# Patient Record
Sex: MALE | Race: BLACK OR AFRICAN AMERICAN | NOT HISPANIC OR LATINO | Employment: UNEMPLOYED | URBAN - METROPOLITAN AREA
[De-identification: names, ages, dates, MRNs, and addresses within clinical notes are randomized per-mention and may not be internally consistent; named-entity substitution may affect disease eponyms.]

---

## 2018-01-16 ENCOUNTER — HOSPITAL ENCOUNTER (OUTPATIENT)
Dept: PREADMISSION TESTING | Facility: HOSPITAL | Age: 33
Discharge: HOME | End: 2018-01-16
Attending: SURGERY
Payer: MEDICAID

## 2018-01-16 VITALS
RESPIRATION RATE: 17 BRPM | HEIGHT: 66 IN | BODY MASS INDEX: 25.04 KG/M2 | HEART RATE: 71 BPM | TEMPERATURE: 99 F | SYSTOLIC BLOOD PRESSURE: 145 MMHG | DIASTOLIC BLOOD PRESSURE: 86 MMHG | WEIGHT: 155.81 LBS | OXYGEN SATURATION: 98 %

## 2018-01-16 RX ORDER — ASPIRIN 325 MG
325 TABLET ORAL DAILY PRN
COMMUNITY
End: 2018-06-26

## 2018-01-16 NOTE — DISCHARGE INSTRUCTIONS
Your surgery is scheduled for_MONDAY January 22, 2018________________.    Call [phone] between 2 pm and 5 pm _FRIDAY January 19, 2018_____ to find out your arrival time for the day of surgery.      Report to SAME DAY SURGERY UNIT at _______AM   on the 2nd floor of the hospital.  Use the front entrance of the hospital before 6 am.                                         Important instructions:   Do not eat or drink after 12 midnight, including water.  It is okay to brush your teeth.                                                                                                                                                                                                           Do not have gum, candy or mints.    TAKE  WELLBUTRIN  WITH A SIP OF WATER THE  AM OF SURGERY               Prep instructions:    SHOWER   OTHER_____________      Please shower the night before and the morning of your surgery.       Use Hibiclens soap to your surgery site if instructed by your pre op nurse.   If your surgery is on your abdomen, be sure to wash your naval( BELLY BUTTON).    Be sure to rinse off Hibiclens after it is on your skin for several minutes.  Do not use Hibiclens on your face or genitals.     You may wear deodorant only.     Do not wear powder, body lotion or cologne.     If you are going home on the same day of surgery, you must have arrangements for a ride home.  You will not be able to drive home if you were given anesthesia or sedation.       Stop taking Aspirin, Ibuprofen, Motrin and Aleve at least 3-5 days before your surgery.                                                                                                                                                                                                                     May take Tylenol / Acetaminophen  If needed     Wear loose fitting clothes allowing for bandages.     Please leave money and valuables home.        You may bring  your cell phone.     Call the doctor if fever or illness should occur before your surgery.    Call [phone] to contact us here at [facility] if needed.

## 2018-01-19 ENCOUNTER — ANESTHESIA EVENT (OUTPATIENT)
Dept: SURGERY | Facility: HOSPITAL | Age: 33
End: 2018-01-19

## 2018-05-14 ENCOUNTER — CLINICAL SUPPORT (OUTPATIENT)
Dept: OCCUPATIONAL MEDICINE | Facility: CLINIC | Age: 33
End: 2018-05-14

## 2018-05-14 DIAGNOSIS — Z11.1 PPD SCREENING TEST: Primary | ICD-10-CM

## 2018-05-14 PROCEDURE — 86580 TB INTRADERMAL TEST: CPT | Mod: S$GLB,,, | Performed by: FAMILY MEDICINE

## 2018-06-26 ENCOUNTER — HOSPITAL ENCOUNTER (OUTPATIENT)
Dept: PREADMISSION TESTING | Facility: HOSPITAL | Age: 33
Discharge: HOME | End: 2018-06-26
Attending: SURGERY
Payer: MEDICAID

## 2018-06-26 VITALS
HEIGHT: 68 IN | OXYGEN SATURATION: 99 % | HEART RATE: 67 BPM | RESPIRATION RATE: 18 BRPM | SYSTOLIC BLOOD PRESSURE: 124 MMHG | BODY MASS INDEX: 24.86 KG/M2 | DIASTOLIC BLOOD PRESSURE: 68 MMHG | WEIGHT: 164 LBS

## 2018-06-26 DIAGNOSIS — Z01.818 PRE-OP TESTING: Primary | ICD-10-CM

## 2018-06-26 LAB
ANION GAP SERPL CALC-SCNC: 11 MMOL/L
BASOPHILS # BLD AUTO: 0.01 K/UL
BASOPHILS NFR BLD: 0.2 %
BUN SERPL-MCNC: 28 MG/DL
CALCIUM SERPL-MCNC: 10.3 MG/DL
CHLORIDE SERPL-SCNC: 105 MMOL/L
CO2 SERPL-SCNC: 23 MMOL/L
CREAT SERPL-MCNC: 1.3 MG/DL
DIFFERENTIAL METHOD: ABNORMAL
EOSINOPHIL # BLD AUTO: 0.1 K/UL
EOSINOPHIL NFR BLD: 1.1 %
ERYTHROCYTE [DISTWIDTH] IN BLOOD BY AUTOMATED COUNT: 12.6 %
EST. GFR  (AFRICAN AMERICAN): >60 ML/MIN/1.73 M^2
EST. GFR  (NON AFRICAN AMERICAN): >60 ML/MIN/1.73 M^2
GLUCOSE SERPL-MCNC: 88 MG/DL
HCT VFR BLD AUTO: 39 %
HGB BLD-MCNC: 13.9 G/DL
LYMPHOCYTES # BLD AUTO: 2.8 K/UL
LYMPHOCYTES NFR BLD: 52 %
MCH RBC QN AUTO: 30.7 PG
MCHC RBC AUTO-ENTMCNC: 35.6 G/DL
MCV RBC AUTO: 86 FL
MONOCYTES # BLD AUTO: 0.5 K/UL
MONOCYTES NFR BLD: 9.6 %
NEUTROPHILS # BLD AUTO: 2 K/UL
NEUTROPHILS NFR BLD: 36.9 %
PLATELET # BLD AUTO: 256 K/UL
PMV BLD AUTO: 11 FL
POTASSIUM SERPL-SCNC: 3.9 MMOL/L
RBC # BLD AUTO: 4.53 M/UL
SODIUM SERPL-SCNC: 139 MMOL/L
WBC # BLD AUTO: 5.31 K/UL

## 2018-06-26 PROCEDURE — 36415 COLL VENOUS BLD VENIPUNCTURE: CPT

## 2018-06-26 PROCEDURE — 80048 BASIC METABOLIC PNL TOTAL CA: CPT

## 2018-06-26 PROCEDURE — 85025 COMPLETE CBC W/AUTO DIFF WBC: CPT

## 2018-06-26 NOTE — DISCHARGE INSTRUCTIONS
Your surgery is scheduled for____7/2/2018_____________.    Call [phone] between 2 pm and 5 pm ___6/29/2018_________ to find out your arrival time for the day of surgery.    Report to SAME DAY SURGERY UNIT at _______am on the 2nd floor of the hospital.  Use the front entrance of the hospital before 6 am.  If you need wheelchair assistance, call [phone] from your cell phone,  or call 0 from the courtesy phone in the hospital lobby.    Important instructions:   Do not eat or drink after 12 midnight, including water.  It is okay to brush your teeth.  Do not have gum, candy or mints.     Take only these medications with a small swallow of water on the morning of your surgery.____wellbutrin_________    Do not take any diabetic medication on the morning of surgery unless instructed to do so by your doctor or pre op nurse.    Stop taking Aspirin, Ibuprofen, Motrin and Aleve , Fish oil, and Vitamin E for at least 7 days before your surgery. You may use Tylenol unless otherwise instructed by your doctor.         Please shower the night before and the morning of your surgery.        Use Hibiclens soap to your surgery site if instructed by your pre op nurse.   If your surgery is on your abdomen, be sure to wash your naval.  Be sure to rinse off Hibiclens after it is on your skin for several minutes.  Do not use Hibiclens on your face or genitals. Please place clean linens on your bed the night before surgery. Please wear fresh clean clothing after each shower.     No shaving of procedural area at least 4-5 days before surgery due to increased risk of skin irritation and/or possible infection.      You may wear deodorant only.      Do not wear powder, body lotion or cologne.     Do not wear any jewelry or have any metal on your body.     Please bring any documents given to you by your doctor.     If you are going home on the same day of surgery, you must have arrangements for a ride home.  You will not be able to  drive home if you were given anesthesia or sedation.     Wear loose fitting clothes allowing for bandages.     Please leave money and valuables home.       You may bring your cell phone.     Call the doctor if fever or illness should occur before your surgery.    Call [phone] to contact us here at [facility] if needed.

## 2018-07-01 ENCOUNTER — ANESTHESIA EVENT (OUTPATIENT)
Dept: SURGERY | Facility: HOSPITAL | Age: 33
End: 2018-07-01
Payer: MEDICAID

## 2018-07-02 ENCOUNTER — ANESTHESIA (OUTPATIENT)
Dept: SURGERY | Facility: HOSPITAL | Age: 33
End: 2018-07-02
Payer: MEDICAID

## 2018-07-02 ENCOUNTER — HOSPITAL ENCOUNTER (OUTPATIENT)
Facility: HOSPITAL | Age: 33
Discharge: HOME | End: 2018-07-02
Attending: SURGERY | Admitting: SURGERY
Payer: MEDICAID

## 2018-07-02 VITALS
SYSTOLIC BLOOD PRESSURE: 156 MMHG | HEIGHT: 68 IN | TEMPERATURE: 98 F | RESPIRATION RATE: 16 BRPM | HEART RATE: 65 BPM | BODY MASS INDEX: 24.86 KG/M2 | WEIGHT: 164 LBS | DIASTOLIC BLOOD PRESSURE: 89 MMHG | OXYGEN SATURATION: 100 %

## 2018-07-02 DIAGNOSIS — K40.90 HERNIA, INGUINAL, LEFT: Primary | ICD-10-CM

## 2018-07-02 PROCEDURE — 27201423 OPTIME MED/SURG SUP & DEVICES STERILE SUPPLY: Performed by: SURGERY

## 2018-07-02 PROCEDURE — 71000016 HC POSTOP RECOV ADDL HR: Performed by: SURGERY

## 2018-07-02 PROCEDURE — 25000003 PHARM REV CODE 250: Performed by: ANESTHESIOLOGY

## 2018-07-02 PROCEDURE — 63600175 PHARM REV CODE 636 W HCPCS: Performed by: SURGERY

## 2018-07-02 PROCEDURE — 36000706: Performed by: SURGERY

## 2018-07-02 PROCEDURE — 37000009 HC ANESTHESIA EA ADD 15 MINS: Performed by: SURGERY

## 2018-07-02 PROCEDURE — 25000003 PHARM REV CODE 250: Performed by: REGISTERED NURSE

## 2018-07-02 PROCEDURE — 71000015 HC POSTOP RECOV 1ST HR: Performed by: SURGERY

## 2018-07-02 PROCEDURE — 36000707: Performed by: SURGERY

## 2018-07-02 PROCEDURE — 00830 ANES HERNIA RPR LWR ABD NOS: CPT | Performed by: SURGERY

## 2018-07-02 PROCEDURE — D9220A PRA ANESTHESIA: Mod: ANES,,, | Performed by: ANESTHESIOLOGY

## 2018-07-02 PROCEDURE — C1781 MESH (IMPLANTABLE): HCPCS | Performed by: SURGERY

## 2018-07-02 PROCEDURE — 63600175 PHARM REV CODE 636 W HCPCS: Performed by: REGISTERED NURSE

## 2018-07-02 PROCEDURE — S0020 INJECTION, BUPIVICAINE HYDRO: HCPCS | Performed by: SURGERY

## 2018-07-02 PROCEDURE — 25000003 PHARM REV CODE 250: Performed by: SURGERY

## 2018-07-02 PROCEDURE — D9220A PRA ANESTHESIA: Mod: CRNA,,, | Performed by: REGISTERED NURSE

## 2018-07-02 PROCEDURE — 37000008 HC ANESTHESIA 1ST 15 MINUTES: Performed by: SURGERY

## 2018-07-02 PROCEDURE — 71000033 HC RECOVERY, INTIAL HOUR: Performed by: SURGERY

## 2018-07-02 DEVICE — MESH PROLENE POLY LG HERNIA: Type: IMPLANTABLE DEVICE | Site: INGUINAL | Status: FUNCTIONAL

## 2018-07-02 RX ORDER — FENTANYL CITRATE 50 UG/ML
INJECTION, SOLUTION INTRAMUSCULAR; INTRAVENOUS
Status: DISCONTINUED | OUTPATIENT
Start: 2018-07-02 | End: 2018-07-02

## 2018-07-02 RX ORDER — METOPROLOL TARTRATE 1 MG/ML
INJECTION, SOLUTION INTRAVENOUS
Status: DISCONTINUED | OUTPATIENT
Start: 2018-07-02 | End: 2018-07-02

## 2018-07-02 RX ORDER — OXYCODONE AND ACETAMINOPHEN 5; 325 MG/1; MG/1
1 TABLET ORAL EVERY 4 HOURS PRN
Status: DISCONTINUED | OUTPATIENT
Start: 2018-07-02 | End: 2018-07-02 | Stop reason: HOSPADM

## 2018-07-02 RX ORDER — SODIUM CHLORIDE, SODIUM LACTATE, POTASSIUM CHLORIDE, CALCIUM CHLORIDE 600; 310; 30; 20 MG/100ML; MG/100ML; MG/100ML; MG/100ML
1000 INJECTION, SOLUTION INTRAVENOUS ONCE
Status: DISCONTINUED | OUTPATIENT
Start: 2018-07-02 | End: 2018-07-02 | Stop reason: HOSPADM

## 2018-07-02 RX ORDER — METOCLOPRAMIDE HYDROCHLORIDE 5 MG/ML
INJECTION INTRAMUSCULAR; INTRAVENOUS
Status: DISCONTINUED | OUTPATIENT
Start: 2018-07-02 | End: 2018-07-02

## 2018-07-02 RX ORDER — GLYCOPYRROLATE 0.2 MG/ML
INJECTION INTRAMUSCULAR; INTRAVENOUS
Status: DISCONTINUED | OUTPATIENT
Start: 2018-07-02 | End: 2018-07-02

## 2018-07-02 RX ORDER — LIDOCAINE HYDROCHLORIDE 10 MG/ML
1 INJECTION, SOLUTION EPIDURAL; INFILTRATION; INTRACAUDAL; PERINEURAL ONCE
Status: DISCONTINUED | OUTPATIENT
Start: 2018-07-02 | End: 2018-07-02 | Stop reason: HOSPADM

## 2018-07-02 RX ORDER — SODIUM CHLORIDE 0.9 % (FLUSH) 0.9 %
3 SYRINGE (ML) INJECTION
Status: DISCONTINUED | OUTPATIENT
Start: 2018-07-02 | End: 2018-07-02 | Stop reason: HOSPADM

## 2018-07-02 RX ORDER — PROPOFOL 10 MG/ML
VIAL (ML) INTRAVENOUS
Status: DISCONTINUED | OUTPATIENT
Start: 2018-07-02 | End: 2018-07-02

## 2018-07-02 RX ORDER — MIDAZOLAM HYDROCHLORIDE 1 MG/ML
INJECTION, SOLUTION INTRAMUSCULAR; INTRAVENOUS
Status: DISCONTINUED | OUTPATIENT
Start: 2018-07-02 | End: 2018-07-02

## 2018-07-02 RX ORDER — SUCCINYLCHOLINE CHLORIDE 20 MG/ML
INJECTION INTRAMUSCULAR; INTRAVENOUS
Status: DISCONTINUED | OUTPATIENT
Start: 2018-07-02 | End: 2018-07-02

## 2018-07-02 RX ORDER — ROCURONIUM BROMIDE 10 MG/ML
INJECTION, SOLUTION INTRAVENOUS
Status: DISCONTINUED | OUTPATIENT
Start: 2018-07-02 | End: 2018-07-02

## 2018-07-02 RX ORDER — BUPIVACAINE HYDROCHLORIDE 5 MG/ML
INJECTION, SOLUTION PERINEURAL
Status: DISCONTINUED | OUTPATIENT
Start: 2018-07-02 | End: 2018-07-02 | Stop reason: HOSPADM

## 2018-07-02 RX ORDER — OXYCODONE AND ACETAMINOPHEN 5; 325 MG/1; MG/1
1 TABLET ORAL EVERY 4 HOURS PRN
Qty: 35 TABLET | Refills: 0 | Status: SHIPPED | OUTPATIENT
Start: 2018-07-02 | End: 2018-07-12

## 2018-07-02 RX ORDER — NEOSTIGMINE METHYLSULFATE 1 MG/ML
INJECTION, SOLUTION INTRAVENOUS
Status: DISCONTINUED | OUTPATIENT
Start: 2018-07-02 | End: 2018-07-02

## 2018-07-02 RX ORDER — PHENYLEPHRINE HYDROCHLORIDE 10 MG/ML
INJECTION INTRAVENOUS
Status: DISCONTINUED | OUTPATIENT
Start: 2018-07-02 | End: 2018-07-02

## 2018-07-02 RX ORDER — HYDROMORPHONE HYDROCHLORIDE 2 MG/ML
0.2 INJECTION, SOLUTION INTRAMUSCULAR; INTRAVENOUS; SUBCUTANEOUS EVERY 5 MIN PRN
Status: DISCONTINUED | OUTPATIENT
Start: 2018-07-02 | End: 2018-07-02 | Stop reason: HOSPADM

## 2018-07-02 RX ORDER — ACETAMINOPHEN 10 MG/ML
INJECTION, SOLUTION INTRAVENOUS
Status: DISCONTINUED | OUTPATIENT
Start: 2018-07-02 | End: 2018-07-02

## 2018-07-02 RX ORDER — LIDOCAINE HCL/PF 100 MG/5ML
SYRINGE (ML) INTRAVENOUS
Status: DISCONTINUED | OUTPATIENT
Start: 2018-07-02 | End: 2018-07-02

## 2018-07-02 RX ORDER — SODIUM CHLORIDE, SODIUM LACTATE, POTASSIUM CHLORIDE, CALCIUM CHLORIDE 600; 310; 30; 20 MG/100ML; MG/100ML; MG/100ML; MG/100ML
INJECTION, SOLUTION INTRAVENOUS CONTINUOUS
Status: DISCONTINUED | OUTPATIENT
Start: 2018-07-02 | End: 2018-07-02 | Stop reason: HOSPADM

## 2018-07-02 RX ORDER — CEFAZOLIN SODIUM 2 G/50ML
2 SOLUTION INTRAVENOUS
Status: DISCONTINUED | OUTPATIENT
Start: 2018-07-02 | End: 2018-07-02 | Stop reason: HOSPADM

## 2018-07-02 RX ADMIN — PHENYLEPHRINE HYDROCHLORIDE 100 MCG: 10 INJECTION INTRAVENOUS at 09:07

## 2018-07-02 RX ADMIN — PHENYLEPHRINE HYDROCHLORIDE 100 MCG: 10 INJECTION INTRAVENOUS at 10:07

## 2018-07-02 RX ADMIN — METOPROLOL TARTRATE 2 MG: 1 INJECTION, SOLUTION INTRAVENOUS at 10:07

## 2018-07-02 RX ADMIN — LIDOCAINE HYDROCHLORIDE 100 MG: 20 INJECTION, SOLUTION INTRAVENOUS at 08:07

## 2018-07-02 RX ADMIN — FENTANYL CITRATE 100 MCG: 50 INJECTION INTRAMUSCULAR; INTRAVENOUS at 08:07

## 2018-07-02 RX ADMIN — MIDAZOLAM HYDROCHLORIDE 2 MG: 1 INJECTION, SOLUTION INTRAMUSCULAR; INTRAVENOUS at 08:07

## 2018-07-02 RX ADMIN — ROCURONIUM BROMIDE 5 MG: 10 INJECTION, SOLUTION INTRAVENOUS at 09:07

## 2018-07-02 RX ADMIN — CEFAZOLIN SODIUM 2 G: 2 SOLUTION INTRAVENOUS at 08:07

## 2018-07-02 RX ADMIN — ACETAMINOPHEN 1000 MG: 10 INJECTION, SOLUTION INTRAVENOUS at 09:07

## 2018-07-02 RX ADMIN — NEOSTIGMINE METHYLSULFATE 5 MG: 1 INJECTION INTRAVENOUS at 10:07

## 2018-07-02 RX ADMIN — SODIUM CHLORIDE, SODIUM LACTATE, POTASSIUM CHLORIDE, AND CALCIUM CHLORIDE 10 ML/HR: .6; .31; .03; .02 INJECTION, SOLUTION INTRAVENOUS at 07:07

## 2018-07-02 RX ADMIN — PROPOFOL 200 MG: 10 INJECTION, EMULSION INTRAVENOUS at 08:07

## 2018-07-02 RX ADMIN — METOCLOPRAMIDE 10 MG: 5 INJECTION, SOLUTION INTRAMUSCULAR; INTRAVENOUS at 09:07

## 2018-07-02 RX ADMIN — SUCCINYLCHOLINE CHLORIDE 100 MG: 20 INJECTION, SOLUTION INTRAMUSCULAR; INTRAVENOUS at 08:07

## 2018-07-02 RX ADMIN — ROCURONIUM BROMIDE 30 MG: 10 INJECTION, SOLUTION INTRAVENOUS at 09:07

## 2018-07-02 RX ADMIN — ROCURONIUM BROMIDE 10 MG: 10 INJECTION, SOLUTION INTRAVENOUS at 09:07

## 2018-07-02 RX ADMIN — GLYCOPYRROLATE 0.6 MG: 0.2 INJECTION, SOLUTION INTRAMUSCULAR; INTRAVENOUS at 10:07

## 2018-07-02 RX ADMIN — METOPROLOL TARTRATE 2 MG: 1 INJECTION, SOLUTION INTRAVENOUS at 09:07

## 2018-07-02 NOTE — TRANSFER OF CARE
"Anesthesia Transfer of Care Note    Patient: Isai Beach    Procedure(s) Performed: Procedure(s) (LRB):  INGUINAL HERNIA REPAIR (Left)    Patient location: PACU    Anesthesia Type: general    Transport from OR: Transported from OR on room air with adequate spontaneous ventilation    Post pain: adequate analgesia    Post assessment: no apparent anesthetic complications and tolerated procedure well    Post vital signs: stable    Level of consciousness: sedated    Nausea/Vomiting: no nausea/vomiting    Complications: none    Transfer of care protocol was followedComments: Oral airway in place      Last vitals:   Visit Vitals  BP (!) 140/79   Pulse 64   Temp 36.5 °C (97.7 °F) (Oral)   Resp 14   Ht 5' 8" (1.727 m)   Wt 74.4 kg (164 lb)   SpO2 100%   BMI 24.94 kg/m²     "

## 2018-07-02 NOTE — OP NOTE
DATE OF PROCEDURE:  07/02/2018    PREOPERATIVE DIAGNOSIS:  Left inguinal hernia.    POSTOPERATIVE DIAGNOSIS:  Left indirect inguinal hernia.    OPERATIVE PROCEDURE:  Left inguinal hernia repair with Prolene hernia system.    SURGEON:  Mir Aragon M.D.    ASSISTANT:  Dr Devlin  PROCEDURE IN DETAIL:  After adequate premedication, the patient was taken to the   Operating Room and placed on operating table in supine position.  General   anesthesia administered via endotracheal tube.  The left groin and scrotum were   prepped and draped in sterile fashion.  A low transverse incision was made,   deepened down through the subcutaneous tissue down to the external oblique   fascia.  External oblique was opened in direction of its fibers.  Cord and   contents elevated from the floor of the canal.  Ilioinguinal nerve mobilized,   isolated and preserved.  Dissection of the cord revealed a very large indirect   sac.  This was dissected free from surrounding tissues and amputated after a   transfixion suture of 2-0 Vicryl and reduced into the preperitoneal space.    Preperitoneal space was then bluntly dissected.  Repair was carried out using a   Prolene hernia system deployed through the internal ring into the preperitoneal   space and after the preperitoneal space had been cleared, the anterior portion   then placed on the floor of the canal and sutured in place with interrupted 2-0   Vicryl beginning at the pubic tubercle extending laterally to the shelving edge   of the inguinal ligament, medially to the transversalis fascia.  Slit was placed   in the anterior mesh to incorporate the cord and recreate a new internal ring.    Once this was accomplished, pain pump inserted.  The external oblique fascia   closed with 3-0 Vicryl, subcutaneous tissue closed with 3-0 Vicryl, skin closed   with subcuticular Monocryl.  Blood loss was negligible.  The patient tolerated   the procedure and was transported to Recovery in stable  condition.      TONIO  dd: 07/02/2018 11:01:12 (CDT)  td: 07/02/2018 14:17:49 (CDT)  Doc ID   #[id number]  Job ID #[id number]    CC:

## 2018-07-02 NOTE — INTERVAL H&P NOTE
The patient has been examined and the H&P has been reviewed:    I concur with the findings and no changes have occurred since H&P was written.    Anesthesia/Surgery risks, benefits and alternative options discussed and understood by patient/family.          Active Hospital Problems    Diagnosis  POA    Hernia, inguinal, left [K40.90]  Yes      Resolved Hospital Problems    Diagnosis Date Resolved POA   No resolved problems to display.

## 2018-07-02 NOTE — ANESTHESIA PREPROCEDURE EVALUATION
07/02/2018  Isai Beach is a 32 y.o., male.    Anesthesia Evaluation    I have reviewed the Patient Summary Reports.    I have reviewed the Nursing Notes.   I have reviewed the Medications.     Review of Systems  Anesthesia Hx:  No problems with previous Anesthesia Denies Hx of Anesthetic complications  Neg history of prior surgery. Denies Family Hx of Anesthesia complications.   Denies Personal Hx of Anesthesia complications.   Social:  No Alcohol Use, Smoker    Hematology/Oncology:  Hematology Normal   Oncology Normal     EENT/Dental:EENT/Dental Normal   Cardiovascular:  Cardiovascular Normal Exercise tolerance: good     Pulmonary:  Pulmonary Normal    Renal/:  Renal/ Normal     Hepatic/GI:  Hepatic/GI Normal    Musculoskeletal:  Musculoskeletal Normal    Neurological:  Neurology Normal    Endocrine:  Endocrine Normal    Dermatological:  Skin Normal    Psych:   depression          Physical Exam  General:  Well nourished    Airway/Jaw/Neck:  Airway Findings: Mouth Opening: Normal General Airway Assessment: Adult  Mallampati: II  TM Distance: Normal, at least 6 cm         Dental:  DENTAL FINDINGS: Normal   Chest/Lungs:  Chest/Lungs Clear    Heart/Vascular:  Heart Findings: Normal Heart murmur: negative       Mental Status:  Mental Status Findings:  Cooperative, Alert and Oriented         Anesthesia Plan  Type of Anesthesia, risks & benefits discussed:  Anesthesia Type:  general  Patient's Preference:   Intra-op Monitoring Plan:   Intra-op Monitoring Plan Comments:   Post Op Pain Control Plan:   Post Op Pain Control Plan Comments:   Induction:   IV  Beta Blocker:  Patient is not currently on a Beta-Blocker (No further documentation required).       Informed Consent: Patient understands risks and agrees with Anesthesia plan.  Questions answered. Anesthesia consent signed with patient.  ASA Score: 2      Day of Surgery Review of History & Physical:            Ready For Surgery From Anesthesia Perspective.

## 2018-07-02 NOTE — PLAN OF CARE
Problem: Patient Care Overview  Goal: Plan of Care Review  Outcome: Ongoing (interventions implemented as appropriate)   07/02/18 1119   Coping/Psychosocial   Plan Of Care Reviewed With patient     Klarissa 9/10. VSS per flow sheet. Sleeps; easy to arouse. AAOx4. Denies pain. Incision to left groin closed with dermabond. Q ball pain pump intact and unclamped. See chart for full assessment. Hand off report to SHERRY Garrido RN SDS.    Problem: Surgery Nonspecified (Adult)  Goal: Signs and Symptoms of Listed Potential Problems Will be Absent, Minimized or Managed (Surgery Nonspecified)  Signs and symptoms of listed potential problems will be absent, minimized or managed by discharge/transition of care (reference Surgery Nonspecified (Adult) CPG).  Outcome: Ongoing (interventions implemented as appropriate)   07/02/18 1119   Surgery Nonspecified   Problems Assessed (Surgery) bleeding/anemia;pain;postoperative nausea and vomiting;respiratory compromise;situational response   Problems Present (Surgery) none     Goal: Anesthesia/Sedation Recovery  Outcome: Outcome(s) achieved Date Met: 07/02/18 07/02/18 1119   Goal/Outcome Evaluation   Anesthesia/Sedation Recovery recovered to baseline;criteria met for transfer

## 2018-07-02 NOTE — BRIEF OP NOTE
[facility]  Brief Operative Note     SUMMARY     Surgery Date: 7/2/2018     Surgeon(s) and Role:     * Mir Aragon MD - Primary    Assisting Surgeon: None    Pre-op Diagnosis:  Hernia, inguinal, left [K40.90]    Post-op Diagnosis:  Post-Op Diagnosis Codes:     * Hernia, inguinal, left [K40.90]    Procedure(s) (LRB):  INGUINAL HERNIA REPAIR (Left)    Anesthesia: General    Description of the findings of the procedure: indirect  Findings/Key Components:phs repair  Estimated Blood Loss: 0       Specimens:   Specimen (12h ago through future)    None          Discharge Note    SUMMARY     Admit Date: 7/2/2018    Discharge Date and Time:  07/02/2018 10:46 AM    Hospital Course sds to home   Final Diagnosis: Post-Op Diagnosis Codes:     * Hernia, inguinal, left [K40.90]    Disposition: Home or Self Care    Follow Up/Patient Instructions:     Medications:  Reconciled Home Medications:      Medication List      START taking these medications    oxyCODONE-acetaminophen 5-325 mg per tablet  Commonly known as:  PERCOCET  Take 1 tablet by mouth every 4 (four) hours as needed.        ASK your doctor about these medications    buPROPion 300 MG 24 hr tablet  Commonly known as:  WELLBUTRIN XL  Take 300 mg by mouth once daily.            Discharge Procedure Orders  Diet Adult Regular     Notify your health care provider if you experience any of the following:  temperature >100.4     Notify your health care provider if you experience any of the following:  persistent nausea and vomiting or diarrhea     Notify your health care provider if you experience any of the following:  severe uncontrolled pain     Notify your health care provider if you experience any of the following:  redness, tenderness, or signs of infection (pain, swelling, redness, odor or green/yellow discharge around incision site)     Remove dressing in 48 hours     Activity as tolerated     Shower on day dressing removed (No bath)        Follow-up Information     Mir Aragon MD In 1 week.    Specialty:  General Surgery  Contact information:  [address] [place] [id number] [phone]

## 2018-07-02 NOTE — DISCHARGE INSTRUCTIONS
Dermabond or a material like it was used on your incision.   It is like a liquid glue.   Do not peel or try to remove it it will start to fall off in 7-10 days on its' own.   It is OK to shower, pat dry, do not apply any creams or lotions.    No tub baths, swimming pools, hot tubs or submersion of the incision until your surgeon says it's ok.       Carlitos Aragon and Rafa   Office # [phone]     Discharge Instructions for Same Day Surgery     Call the office for and appointment if one has not already been made.     Diet: Drink plenty of fluids the first 48 hours and you may resume your   usual diet.     Activity: No heavy lifting (over 10 pounds), pushing or pulling until your   post op visit. Your doctor's office may have told you to limit your lifting to less weight, or even no weight.  Be sure to follow those instructions.    Note: You have had anesthesia and narcotic pain meds , Do NOT drink alcohol, or sign legal documents for 24 hours, or if taking narcotic pain medication.     You may ride in a car  and you may drive when comfortable if no longer taking the narcotic pain pill      Drains:  You have a pain ball (ON Q PAIN PUMP ) in place. It will help you with pain management . It will be removed by your physician or you may be instructed  to remove it. Follow instructions from the insert.     Medical: Call the doctor for any of the following problems: fever above 101,   severe pain, bleeding, or abdominal distention (swelling).   If constipated you may take any stool softener you choose.     Occasionally small areas of skin numbness or an unpleasant skin sensation can result. Also, you may find that your incision is swollen and tender for a few days.  Some redness around sutures and staples is a normal reaction, but if the discomfort persists or worsens, call you doctor.         Fall Prevention  Millions of people fall every year and injure themselves. You may have had anesthesia or sedation which may  increase your risk of falling. You may have health issues that put you at an increased risk of falling.     Here are ways to reduce your risk of falling.  ·   · Make your home safe by keeping walkways clear of objects you may trip over.  · Use non-slip pads under rugs. Do not use area rugs or small throw rugs.  · Use non-slip mats in bathtubs and showers.  · Install handrails and lights on staircases.  · Do not walk in poorly lit areas.  · Do not stand on chairs or wobbly ladders.  · Use caution when reaching overhead or looking upward. This position can cause a loss of balance.  · Be sure your shoes fit properly, have non-slip bottoms and are in good condition.   · Wear shoes both inside and out. Avoid going barefoot or wearing slippers.  · Be cautious when going up and down stairs, curbs, and when walking on uneven sidewalks.  · If your balance is poor, consider using a cane or walker.  · If your fall was related to alcohol use, stop or limit alcohol intake.   · If your fall was related to use of sleeping medicines, talk to your doctor about this. You may need to reduce your dosage at bedtime if you awaken during the night to go to the bathroom.    · To reduce the need for nighttime bathroom trips:  ¨ Avoid drinking fluids for several hours before going to bed  ¨ Empty your bladder before going to bed  ¨ Men can keep a urinal at the bedside  · Stay as active as you can. Balance, flexibility, strength, and endurance all come from exercise. They all play a role in preventing falls. Ask your healthcare provider which types of activity are right for you.  · Get your vision checked on a regular basis.  · If you have pets, know where they are before you stand up or walk so you don't trip over them.  · Use night lights.

## 2018-07-03 NOTE — ANESTHESIA POSTPROCEDURE EVALUATION
"Anesthesia Post Evaluation    Patient: Isai Beach    Procedure(s) Performed: Procedure(s) (LRB):  INGUINAL HERNIA REPAIR (Left)    Final Anesthesia Type: general  Patient location during evaluation: PACU  Patient participation: Yes- Able to Participate  Level of consciousness: awake and alert, oriented and awake  Post-procedure vital signs: reviewed and stable  Airway patency: patent  PONV status at discharge: No PONV  Anesthetic complications: no      Cardiovascular status: blood pressure returned to baseline  Respiratory status: unassisted, spontaneous ventilation and room air  Hydration status: euvolemic  Follow-up not needed.        Visit Vitals  BP (!) 156/89   Pulse 65   Temp 36.4 °C (97.6 °F) (Oral)   Resp 16   Ht 5' 8" (1.727 m)   Wt 74.4 kg (164 lb)   SpO2 100%   BMI 24.94 kg/m²       Pain/Klarissa Score: Pain Assessment Performed: Yes (7/2/2018 12:36 PM)  Presence of Pain: complains of pain/discomfort (7/2/2018 11:25 AM)  Pain Rating Post Med Admin: 2 (7/2/2018 12:36 PM)  Klarissa Score: 9 (7/2/2018 11:15 AM)      "

## 2018-10-30 DIAGNOSIS — M25.562 LEFT KNEE PAIN: Primary | ICD-10-CM

## 2018-11-02 ENCOUNTER — HOSPITAL ENCOUNTER (OUTPATIENT)
Dept: RADIOLOGY | Facility: HOSPITAL | Age: 33
Discharge: HOME | End: 2018-11-02
Attending: INTERNAL MEDICINE
Payer: MEDICAID

## 2018-11-02 DIAGNOSIS — M25.562 LEFT KNEE PAIN: ICD-10-CM

## 2018-11-02 PROCEDURE — 73562 X-RAY EXAM OF KNEE 3: CPT | Mod: 26,LT,, | Performed by: RADIOLOGY

## 2018-11-02 PROCEDURE — 73562 X-RAY EXAM OF KNEE 3: CPT | Mod: TC,FY,LT

## 2020-01-29 ENCOUNTER — OCCUPATIONAL HEALTH (OUTPATIENT)
Dept: URGENT CARE | Facility: CLINIC | Age: 35
End: 2020-01-29

## 2020-01-29 DIAGNOSIS — Z00.00 ANNUAL PHYSICAL EXAM: Primary | ICD-10-CM

## 2020-01-29 PROCEDURE — 86580 POCT TB SKIN TEST: ICD-10-PCS | Mod: S$GLB,,, | Performed by: FAMILY MEDICINE

## 2020-01-29 PROCEDURE — 86580 TB INTRADERMAL TEST: CPT | Mod: S$GLB,,, | Performed by: FAMILY MEDICINE

## 2020-01-31 LAB
TB INDURATION - 48 HR READ: 0 MM
TB INDURATION - 72 HR READ: NORMAL
TB SKIN TEST - 48 HR READ: NEGATIVE
TB SKIN TEST - 72 HR READ: NORMAL

## 2021-06-23 ENCOUNTER — HOSPITAL ENCOUNTER (EMERGENCY)
Facility: HOSPITAL | Age: 36
Discharge: HOME | End: 2021-06-23
Attending: EMERGENCY MEDICINE
Payer: MEDICAID

## 2021-06-23 VITALS
OXYGEN SATURATION: 99 % | HEIGHT: 65 IN | RESPIRATION RATE: 18 BRPM | BODY MASS INDEX: 27.82 KG/M2 | TEMPERATURE: 98 F | DIASTOLIC BLOOD PRESSURE: 76 MMHG | WEIGHT: 167 LBS | SYSTOLIC BLOOD PRESSURE: 138 MMHG | HEART RATE: 81 BPM

## 2021-06-23 DIAGNOSIS — H66.92 LEFT OTITIS MEDIA, UNSPECIFIED OTITIS MEDIA TYPE: Primary | ICD-10-CM

## 2021-06-23 PROCEDURE — 25000003 PHARM REV CODE 250: Performed by: NURSE PRACTITIONER

## 2021-06-23 PROCEDURE — 99284 EMERGENCY DEPT VISIT MOD MDM: CPT

## 2021-06-23 RX ORDER — ACETAMINOPHEN 500 MG
500 TABLET ORAL EVERY 6 HOURS PRN
Qty: 20 TABLET | Refills: 0 | Status: SHIPPED | OUTPATIENT
Start: 2021-06-23

## 2021-06-23 RX ORDER — IBUPROFEN 600 MG/1
600 TABLET ORAL
Status: COMPLETED | OUTPATIENT
Start: 2021-06-23 | End: 2021-06-23

## 2021-06-23 RX ORDER — IBUPROFEN 600 MG/1
600 TABLET ORAL EVERY 6 HOURS PRN
Qty: 20 TABLET | Refills: 0 | Status: SHIPPED | OUTPATIENT
Start: 2021-06-23

## 2021-06-23 RX ORDER — AMOXICILLIN AND CLAVULANATE POTASSIUM 875; 125 MG/1; MG/1
1 TABLET, FILM COATED ORAL 2 TIMES DAILY
Qty: 14 TABLET | Refills: 0 | Status: SHIPPED | OUTPATIENT
Start: 2021-06-23

## 2021-06-23 RX ORDER — AMOXICILLIN AND CLAVULANATE POTASSIUM 875; 125 MG/1; MG/1
1 TABLET, FILM COATED ORAL
Status: COMPLETED | OUTPATIENT
Start: 2021-06-23 | End: 2021-06-23

## 2021-06-23 RX ADMIN — IBUPROFEN 600 MG: 600 TABLET ORAL at 09:06

## 2021-06-23 RX ADMIN — AMOXICILLIN AND CLAVULANATE POTASSIUM 1 TABLET: 875; 125 TABLET, FILM COATED ORAL at 09:06

## (undated) DEVICE — COVER OVERHEAD SURG LT BLUE

## (undated) DEVICE — Device

## (undated) DEVICE — CHLORAPREP W TINT 26ML APPL

## (undated) DEVICE — SEE MEDLINE ITEM 157117

## (undated) DEVICE — SEE L#120831

## (undated) DEVICE — DRESSING TRANS 2X2 TEGADERM

## (undated) DEVICE — SUT CTD VICRYL 3-0 CR/SH

## (undated) DEVICE — SUT 2/0 36IN COATED VICRYL

## (undated) DEVICE — GLOVE BIOGEL ECLIPSE SZ 7.5

## (undated) DEVICE — SUT CTD VICRYL BR CR/SH VIL

## (undated) DEVICE — CLIPPER BLADE MOD 4406 (CAREF)

## (undated) DEVICE — DRAIN PENROSE XRAY 18 X 1/4 ST

## (undated) DEVICE — KIT ON-Q PAIN FIX 6.5MM 100ML

## (undated) DEVICE — ELECTRODE REM PLYHSV RETURN 9

## (undated) DEVICE — CLOSURE SKIN STERI STRIP 1/2X4